# Patient Record
Sex: FEMALE | Race: WHITE
[De-identification: names, ages, dates, MRNs, and addresses within clinical notes are randomized per-mention and may not be internally consistent; named-entity substitution may affect disease eponyms.]

---

## 2017-07-07 ENCOUNTER — HOSPITAL ENCOUNTER (EMERGENCY)
Dept: HOSPITAL 75 - ER | Age: 27
Discharge: HOME | End: 2017-07-07
Payer: MEDICARE

## 2017-07-07 VITALS — WEIGHT: 125.44 LBS | BODY MASS INDEX: 22.23 KG/M2 | HEIGHT: 63 IN

## 2017-07-07 VITALS — SYSTOLIC BLOOD PRESSURE: 118 MMHG | DIASTOLIC BLOOD PRESSURE: 68 MMHG
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
## 2017-07-07 DIAGNOSIS — F32.9: ICD-10-CM

## 2017-07-07 DIAGNOSIS — Z87.39: ICD-10-CM

## 2017-07-07 DIAGNOSIS — F41.9: ICD-10-CM

## 2017-07-07 DIAGNOSIS — S40.012A: Primary | ICD-10-CM

## 2017-07-07 PROCEDURE — 73000 X-RAY EXAM OF COLLAR BONE: CPT

## 2017-07-07 PROCEDURE — 73030 X-RAY EXAM OF SHOULDER: CPT

## 2017-07-07 PROCEDURE — 99282 EMERGENCY DEPT VISIT SF MDM: CPT

## 2017-07-07 NOTE — DIAGNOSTIC IMAGING REPORT
INDICATION: Left shoulder pain after a fall.



3 views of the left shoulder show no fracture, dislocation or

other acute abnormalities.



IMPRESSION: Negative left shoulder.



Dictated by: 



  Dictated on workstation # [id number]

## 2017-07-07 NOTE — DIAGNOSTIC IMAGING REPORT
INDICATION: Left shoulder injury



2 views of the left clavicle show no fracture or dislocation.



IMPRESSION: Negative left clavicle.



Dictated by: 



  Dictated on workstation # [id number]

## 2017-07-07 NOTE — ED UPPER EXTREMITY
General


Chief Complaint:  Upper Extremity


Stated Complaint:  RT SHOULDER INJ FALL FROM PORCH


Source:  patient





History of Present Illness


Time seen by provider:  13:40


Initial Comments


The patient is a 27-year-old white female who reports that just prior to 

admission she fell off her porch and landed heavily on her left shoulder.  When 

Questioned as to why this happened she reported that she had been lecturing her 

children on what might happen if they leaned over too far and fell.  She 

apparently demonstrated in a graphic sort of way.  She complains of pain at the 

left clavicle and labrum areas.  There is no visible defect.


Onset:  just prior to arrival


Pain/Injury Location:  left shoulder


Method of Injury:  fell





Allergies and Home Medications


Allergies


Coded Allergies:  


     tramadol (Unverified  Allergy, Mild, RASH, 12/21/09)





Home Medications


Hydrocodone/Acetaminophen 1 Each Tablet, 1-2 EACH PO Q4H, #35


   Prescribed by: ANJEL ANTUNEZ on 9/9/16 1002


Omeprazole 20 Mg Tablet.dr, 20 MG PO DAILY, (Reported)


Ondansetron 4 Mg Tab.rapdis, 4 MG PO Q4H PRN for NAUSEA, (Reported)


Ranitidine HCl 150 Mg Tablet, 150 MG PO DAILY, (Reported)


[bcp]  , 1 TAB PO DAILY, (Reported)





Constitutional:  see HPI


EENTM:  no symptoms reported


Respiratory:  no symptoms reported


Cardiovascular:  no symptoms reported


Gastrointestinal:  no symptoms reported


Musculoskeletal:  other (left shoulder pain)





Past Medical-Social-Family Hx


Patient Social History


Type Used:  Cigarettes


Recent Foreign Travel:  No


Contact w/Someone Who Travel:  No





Immunizations Up To Date


Tetanus Booster (TDap):  Less than 5yrs


PED Vaccines UTD:  Yes





Seasonal Allergies


Seasonal Allergies:  No





Surgeries


HX Surgeries:  No





Respiratory


Hx Respiratory Disorders:  No





Cardiovascular


Hx Cardiac Disorders:  No





Neurological


Hx Neurological Disorders:  Yes





Reproductive System


Hx Reproductive Disorders:  No


Sexually Transmitted Disease:  No


HIV/AIDS:  No


Female Reproductive Disorders:  Denies





Genitourinary


Hx Genitourinary Disorders:  No





Gastrointestinal


Hx Gastrointestinal Disorders:  Yes


Gastrointestinal Disorders:  Gall Bladder Disease





Musculoskeletal


Hx Musculoskeletal Disorders:  No


Musculoskeletal Disorders:  Fractures





Endocrine


Hx Endocrine Disorders:  No





HEENT


HX ENT Disorders:  No


Loss of Vision:  Denies


Hearing Impairment:  Denies





Cancer


Hx Cancer:  No





Psychosocial


Hx Psychiatric Problems:  Yes


Behavioral Health Disorders:  Anxiety, Depression





Integumentary


HX Skin/Integumentary Disorder:  No





Blood Transfusions


Hx Blood Disorders:  No


Adverse Reaction to a Blood Tr:  No





Family Medical History


Significant Family History:  No Pertinent Family Hx


Family Medial History:  


Asthma


  21 FATHER, Onset:Childhood


Thyroid disease


  21 MOTHER, Onset:30's - 40





Physical Exam


Vital Signs





Vital Sign - Last 12Hours








 7/7/17





 13:30


 


Temp 97.3


 


Pulse 85


 


Resp 16


 


B/P (MAP) 118/68


 


Pulse Ox 97


 


O2 Delivery Room Air





Capillary Refill :


General Appearance:  moderate distress


HEENT:  normal ENT inspection


Neck:  full range of motion


Cardiovascular:  normal peripheral pulses, regular rate, rhythm, no edema, no 

gallop, no JVD, no murmur


Respiratory:  chest non-tender, lungs clear, normal breath sounds, no 

respiratory distress, no accessory muscle use


Comments


Tender to palpation along the left clavicle without palpable deformity.  

Palpable pain at the area of the labrum without step-off





Progress/Results/Core Measures


Results/Orders


My Orders





Orders - ANAHY ZAIDI MD


Shoulder, Left, 3 Views (7/7/17 13:46)


Clavicle, Left (7/7/17 13:46)





Vital Signs/I&O





Vital Sign - Last 12Hours








 7/7/17





 13:30


 


Temp 97.3


 


Pulse 85


 


Resp 16


 


B/P (MAP) 118/68


 


Pulse Ox 97


 


O2 Delivery Room Air











Departure


Communication


Progress Notes


X-rays show no bony abnormalities of the shoulder or clavicle.





Impression


Impression:  


 Primary Impression:  


 left shoulder contusion


Disposition:  01 HOME, SELF-CARE


Condition:  Stable/Unchanged





Departure-Patient Inst.


Decision time for Depature:  14:28


Referrals:  


TERE FLEMING MD (PCP/Family)


Primary Care Physician


Patient Instructions:  How to Use a Shoulder Sling





Add. Discharge Instructions:  


All discharge instructions reviewed with patient and/or family. Voiced 

understanding.


The patient is informed that there are no bony abnormalities.  The possibility 

of rotator cuff injury remains.





Use shoulder sling as demonstrated.


Use ice pack for 30 minutes at least 3 times before bedtime.


Ibuprofen 600 mg 3 times daily


If you are unable to recognize any improvement by one week's time, see your 

provider for possible referral to orthopedics.











ANAHY ZAIDI MD Jul 7, 2017 13:54

## 2017-07-11 NOTE — XMS REPORT
[facility]

 Created on: 10/25/2016



Lorena Ty

External Reference #: [id number]

: 1990

Sex: Female



Demographics







 Address  [address] [place]  [address]

 

 Home Phone  [phone]

 

 Preferred Language  Unknown

 

 Marital Status  Unknown

 

 [employer] Affiliation  Unknown

 

 Race  White

 

 Ethnic Group  Not  or 





Author







 BRAIN Best

 

 Organization  [employer]

 

 Address  Unknown

 

 Phone  Unavailable







Care Team Providers







 Care Team Member Name  Role  Phone

 

 BRAIN PRATT  CP  Unavailable



                                                                



Allergies

          No Known Allergies                                                   
                                     



Problems

          





 Problem Type  Condition  Code  Onset Dates  Condition Status

 

 Problem  Generalized anxiety disorder  F41.1     Active

 

 Problem  ADHD, predominantly inattentive type  F90.0     Active

 

 Problem  Depressive disorder, not elsewhere classified  F32.9     Active

 

 Assessment  ADHD, predominantly inattentive type  F90.0     Active

 

 Assessment  Depressive disorder, not elsewhere classified  F32.9     Active

 

 Assessment  Generalized anxiety disorder  F41.1     Active



                                                                               
                                                           



Medications

          No Known Medications                                                 
                                       



Procedures

          





 Procedure  Coding System  Code  Date

 

 Psychotherapy, patient &/family, 45 minutes, established patient  CPT-4  27397
  Oct 21, 2016



                                                                              



Results

          No Known Results                                                     
               



Summary Purpose

          [employer] Submission

## 2018-01-10 ENCOUNTER — HOSPITAL ENCOUNTER (OUTPATIENT)
Dept: HOSPITAL 75 - RAD | Age: 28
End: 2018-01-10
Attending: OBSTETRICS & GYNECOLOGY
Payer: MEDICARE

## 2018-01-10 DIAGNOSIS — E04.9: Primary | ICD-10-CM

## 2018-01-10 PROCEDURE — 76536 US EXAM OF HEAD AND NECK: CPT

## 2018-01-10 NOTE — DIAGNOSTIC IMAGING REPORT
PROCEDURE: US Thyroid.



TECHNIQUE: Multiple real-time grayscale images were obtained of

the thyroid in various projections.



INDICATION: Goiter.



FINDINGS: There are no prior studies available for comparison.



The thyroid gland is prominent with the right lobe measuring 5.3

x 1.9 x 1.9 cm and the left lobe estimated to be 4.8 x 1.7 x 1.7

cm (normal gland size 4-5 x 2 x 2 cm or less). There is no

discrete solid or cystic mass within either lobe, but the thyroid

gland itself has somewhat of a coarse echotexture.



IMPRESSION:

1. The thyroid gland is at the upper limits of normal in size,

but there is no discrete solid or cystic mass identified.

2. If further imaging evaluation of the thyroid gland is desired,

then a nuclear medicine thyroid scan with uptake will be

recommended.



Dictated by: 



  Dictated on workstation # [id number]

## 2018-06-17 ENCOUNTER — HOSPITAL ENCOUNTER (INPATIENT)
Dept: HOSPITAL 75 - LDRP | Age: 28
LOS: 2 days | Discharge: HOME | End: 2018-06-19
Attending: OBSTETRICS & GYNECOLOGY | Admitting: OBSTETRICS & GYNECOLOGY
Payer: MEDICARE

## 2018-06-17 VITALS — DIASTOLIC BLOOD PRESSURE: 75 MMHG | SYSTOLIC BLOOD PRESSURE: 106 MMHG

## 2018-06-17 VITALS — SYSTOLIC BLOOD PRESSURE: 107 MMHG | DIASTOLIC BLOOD PRESSURE: 67 MMHG

## 2018-06-17 VITALS — DIASTOLIC BLOOD PRESSURE: 56 MMHG | SYSTOLIC BLOOD PRESSURE: 99 MMHG

## 2018-06-17 VITALS — DIASTOLIC BLOOD PRESSURE: 67 MMHG | SYSTOLIC BLOOD PRESSURE: 105 MMHG

## 2018-06-17 VITALS — SYSTOLIC BLOOD PRESSURE: 112 MMHG | DIASTOLIC BLOOD PRESSURE: 72 MMHG

## 2018-06-17 VITALS — DIASTOLIC BLOOD PRESSURE: 57 MMHG | SYSTOLIC BLOOD PRESSURE: 113 MMHG

## 2018-06-17 VITALS — DIASTOLIC BLOOD PRESSURE: 65 MMHG | SYSTOLIC BLOOD PRESSURE: 104 MMHG

## 2018-06-17 VITALS — DIASTOLIC BLOOD PRESSURE: 57 MMHG | SYSTOLIC BLOOD PRESSURE: 117 MMHG

## 2018-06-17 VITALS — SYSTOLIC BLOOD PRESSURE: 123 MMHG | DIASTOLIC BLOOD PRESSURE: 67 MMHG

## 2018-06-17 VITALS — SYSTOLIC BLOOD PRESSURE: 106 MMHG | DIASTOLIC BLOOD PRESSURE: 55 MMHG

## 2018-06-17 VITALS — DIASTOLIC BLOOD PRESSURE: 80 MMHG | SYSTOLIC BLOOD PRESSURE: 120 MMHG

## 2018-06-17 VITALS — SYSTOLIC BLOOD PRESSURE: 112 MMHG | DIASTOLIC BLOOD PRESSURE: 57 MMHG

## 2018-06-17 VITALS — DIASTOLIC BLOOD PRESSURE: 69 MMHG | SYSTOLIC BLOOD PRESSURE: 111 MMHG

## 2018-06-17 VITALS — SYSTOLIC BLOOD PRESSURE: 122 MMHG | DIASTOLIC BLOOD PRESSURE: 75 MMHG

## 2018-06-17 VITALS — SYSTOLIC BLOOD PRESSURE: 118 MMHG | DIASTOLIC BLOOD PRESSURE: 58 MMHG

## 2018-06-17 VITALS — SYSTOLIC BLOOD PRESSURE: 116 MMHG | DIASTOLIC BLOOD PRESSURE: 59 MMHG

## 2018-06-17 VITALS — SYSTOLIC BLOOD PRESSURE: 116 MMHG | DIASTOLIC BLOOD PRESSURE: 65 MMHG

## 2018-06-17 VITALS — SYSTOLIC BLOOD PRESSURE: 106 MMHG | DIASTOLIC BLOOD PRESSURE: 65 MMHG

## 2018-06-17 VITALS — SYSTOLIC BLOOD PRESSURE: 109 MMHG | DIASTOLIC BLOOD PRESSURE: 63 MMHG

## 2018-06-17 VITALS — SYSTOLIC BLOOD PRESSURE: 135 MMHG | DIASTOLIC BLOOD PRESSURE: 93 MMHG

## 2018-06-17 VITALS — DIASTOLIC BLOOD PRESSURE: 79 MMHG | SYSTOLIC BLOOD PRESSURE: 97 MMHG

## 2018-06-17 VITALS — BODY MASS INDEX: 25.16 KG/M2 | WEIGHT: 142 LBS | HEIGHT: 63 IN

## 2018-06-17 VITALS — SYSTOLIC BLOOD PRESSURE: 103 MMHG | DIASTOLIC BLOOD PRESSURE: 65 MMHG

## 2018-06-17 VITALS — SYSTOLIC BLOOD PRESSURE: 137 MMHG | DIASTOLIC BLOOD PRESSURE: 84 MMHG

## 2018-06-17 VITALS — DIASTOLIC BLOOD PRESSURE: 55 MMHG | SYSTOLIC BLOOD PRESSURE: 108 MMHG

## 2018-06-17 VITALS — SYSTOLIC BLOOD PRESSURE: 116 MMHG | DIASTOLIC BLOOD PRESSURE: 69 MMHG

## 2018-06-17 VITALS — SYSTOLIC BLOOD PRESSURE: 110 MMHG | DIASTOLIC BLOOD PRESSURE: 64 MMHG

## 2018-06-17 VITALS — DIASTOLIC BLOOD PRESSURE: 66 MMHG | SYSTOLIC BLOOD PRESSURE: 112 MMHG

## 2018-06-17 VITALS — DIASTOLIC BLOOD PRESSURE: 55 MMHG | SYSTOLIC BLOOD PRESSURE: 110 MMHG

## 2018-06-17 VITALS — SYSTOLIC BLOOD PRESSURE: 114 MMHG | DIASTOLIC BLOOD PRESSURE: 67 MMHG

## 2018-06-17 VITALS — DIASTOLIC BLOOD PRESSURE: 60 MMHG | SYSTOLIC BLOOD PRESSURE: 108 MMHG

## 2018-06-17 VITALS — DIASTOLIC BLOOD PRESSURE: 66 MMHG | SYSTOLIC BLOOD PRESSURE: 109 MMHG

## 2018-06-17 DIAGNOSIS — F17.210: ICD-10-CM

## 2018-06-17 DIAGNOSIS — Z3A.37: ICD-10-CM

## 2018-06-17 LAB
APTT PPP: YELLOW S
BACTERIA #/AREA URNS HPF: NEGATIVE /HPF
BARBITURATES UR QL: NEGATIVE
BASOPHILS # BLD AUTO: 0 10^3/UL (ref 0–0.1)
BASOPHILS NFR BLD AUTO: 0 % (ref 0–10)
BENZODIAZ UR QL SCN: NEGATIVE
BILIRUB UR QL STRIP: NEGATIVE
COCAINE UR QL: NEGATIVE
EOSINOPHIL # BLD AUTO: 0.1 10^3/UL (ref 0–0.3)
EOSINOPHIL NFR BLD AUTO: 1 % (ref 0–10)
ERYTHROCYTE [DISTWIDTH] IN BLOOD BY AUTOMATED COUNT: 13.8 % (ref 10–14.5)
FIBRINOGEN PPP-MCNC: CLEAR MG/DL
GLUCOSE UR STRIP-MCNC: NEGATIVE MG/DL
HCT VFR BLD CALC: 38 % (ref 35–52)
HGB BLD-MCNC: 12.7 G/DL (ref 11.5–16)
KETONES UR QL STRIP: NEGATIVE
LEUKOCYTE ESTERASE UR QL STRIP: (no result)
LYMPHOCYTES # BLD AUTO: 3 X 10^3 (ref 1–4)
LYMPHOCYTES NFR BLD AUTO: 27 % (ref 12–44)
MANUAL DIFFERENTIAL PERFORMED BLD QL: NO
MCH RBC QN AUTO: 29 PG (ref 25–34)
MCHC RBC AUTO-ENTMCNC: 34 G/DL (ref 32–36)
MCV RBC AUTO: 86 FL (ref 80–99)
METHADONE UR QL SCN: NEGATIVE
METHAMPHETAMINE SCREEN URINE S: NEGATIVE
MONOCYTES # BLD AUTO: 1 X 10^3 (ref 0–1)
MONOCYTES NFR BLD AUTO: 9 % (ref 0–12)
NEUTROPHILS # BLD AUTO: 6.8 X 10^3 (ref 1.8–7.8)
NEUTROPHILS NFR BLD AUTO: 62 % (ref 42–75)
NITRITE UR QL STRIP: NEGATIVE
OPIATES UR QL SCN: NEGATIVE
OXYCODONE UR QL: NEGATIVE
PH UR STRIP: 7 [PH] (ref 5–9)
PLATELET # BLD: 224 10^3/UL (ref 130–400)
PMV BLD AUTO: 10.6 FL (ref 7.4–10.4)
PROPOXYPH UR QL: NEGATIVE
PROT UR QL STRIP: NEGATIVE
RBC # BLD AUTO: 4.41 10^6/UL (ref 4.35–5.85)
RBC #/AREA URNS HPF: (no result) /HPF
SP GR UR STRIP: 1 (ref 1.02–1.02)
SQUAMOUS #/AREA URNS HPF: (no result) /HPF
TRICYCLICS UR QL SCN: NEGATIVE
UROBILINOGEN UR-MCNC: NORMAL MG/DL
WBC # BLD AUTO: 10.9 10^3/UL (ref 4.3–11)
WBC #/AREA URNS HPF: (no result) /HPF

## 2018-06-17 PROCEDURE — 85025 COMPLETE CBC W/AUTO DIFF WBC: CPT

## 2018-06-17 PROCEDURE — 80306 DRUG TEST PRSMV INSTRMNT: CPT

## 2018-06-17 PROCEDURE — 86900 BLOOD TYPING SEROLOGIC ABO: CPT

## 2018-06-17 PROCEDURE — 99212 OFFICE O/P EST SF 10 MIN: CPT

## 2018-06-17 PROCEDURE — 86901 BLOOD TYPING SEROLOGIC RH(D): CPT

## 2018-06-17 PROCEDURE — 36415 COLL VENOUS BLD VENIPUNCTURE: CPT

## 2018-06-17 PROCEDURE — 86850 RBC ANTIBODY SCREEN: CPT

## 2018-06-17 PROCEDURE — 81000 URINALYSIS NONAUTO W/SCOPE: CPT

## 2018-06-17 PROCEDURE — 0UQMXZZ REPAIR VULVA, EXTERNAL APPROACH: ICD-10-PCS | Performed by: OBSTETRICS & GYNECOLOGY

## 2018-06-17 RX ADMIN — Medication SCH ML: at 22:38

## 2018-06-17 RX ADMIN — BENZOCAINE AND LEVOMENTHOL PRN ML: 200; 5 SPRAY TOPICAL at 20:55

## 2018-06-17 RX ADMIN — KETOROLAC TROMETHAMINE SCH MG: 30 INJECTION, SOLUTION INTRAMUSCULAR; INTRAVENOUS at 20:45

## 2018-06-17 RX ADMIN — KETOROLAC TROMETHAMINE SCH MG: 30 INJECTION, SOLUTION INTRAMUSCULAR; INTRAVENOUS at 19:53

## 2018-06-17 RX ADMIN — Medication SCH ML: at 17:20

## 2018-06-17 RX ADMIN — OXYCODONE HYDROCHLORIDE AND ACETAMINOPHEN PRN TAB: 5; 325 TABLET ORAL at 21:08

## 2018-06-17 RX ADMIN — DOCUSATE SODIUM SCH MG: 100 CAPSULE ORAL at 20:55

## 2018-06-17 NOTE — OPERATIVE REPORT
DATE OF SERVICE:  06/17/2018



DELIVERY NOTE



The patient delivered by term spontaneous vaginal delivery a viable female

infant with Apgars of 8 and 9 at 1 and 5 minutes respectively, weight that is

pending at this time.  Birth time of 18:27.  Cord blood gas is pending.  The

patient delivered under epidural analgesia over an intact perineum.  The infant

was bulb suctioned on delivery of the head and nuchal cord was easily released

and then the delivery completed.  Infant was bulb suctioned again.  The

umbilical cord was doubly clamped, father cut the cord.  Baby was passed to

mother's abdomen.  The placenta delivered spontaneously Purvis.  It had a

fairly large accessory lobe, but was otherwise normal with a 3-vessel cord.  It

was sent to pathology for permanent section.  The cervix, vagina, rectum and

perineum were examined and found intact, except for a left periurethral

laceration full thickness through the labia minora inner fold.  This was

repaired with interrupted sutures of 3-0 Vicryl Rapide.  There was a superficial

abrasion on the right in the same area that was not bleeding and was not

repaired.



The patient tolerated the delivery and the repair well and remained in the LDR

for recovery.  The baby remained with the mother.





Job ID: [id number]

DocumentID: [id number]

Dictated Date:  06/17/2018 18:42:36

Transcription Date: 06/17/2018 18:58:13

Dictated By: JULIA MANRIQUE MD

## 2018-06-17 NOTE — HISTORY & PHYSICAL
History and Physical


Date Seen by Provider:  2018


Time Seen by Provider:  14:36


Patient is a P4 white female with a due date of 2018 putting her at 37 

Weeks gestation.  She presented with complaint of contractions.  She was 

dilated 4-5 cm.  She denies ruptured membranes or bleeding.  She's had a 

problem with pregnancy.  She says uncomplicated vaginal deliveries.  Her GBS 

culture was negative on 2018





Allergies tramadol which causes hives





Medications are prenatal vitamins





Medical social and surgical histories are per the antepartum record HEENT exam 

is normal except for her exceedingly poor dentition


Neck is supple no lymphadenopathy no thyromegaly is gravid soft nontender 

nondistended


Extremities shows no clubbing cyanosis.  There is no Homans sign.


Pelvic exam is pending per nursing and patient is 5+ and dilated with a bulging 

bag and a vertex presentation





Laboratory Tests


18 12:20

















Assessment and plan   term pregnancy in spontaneous labor.  Patient has a 

history of rapid progress and short labors.  Anticipation is for vaginal 

delivery with likely shortly


Term pregnancy in labor





Allergies and Home Medications


Allergies


Coded Allergies:  


     tramadol (Unverified  Allergy, Mild, RASH, 09)





Home Medications


Multivitamin 1 Each Tab.chew, 3 EACH PO DAILY, (Reported)





Patient Home Medication List


Home Medication List Reviewed:  Yes





Clinical Quality Measures


DVT/VTE Risk/Contraindication:


Risk Factor Score Per Nursin


RFS Level Per Nursing on Admit:  2=Moderate











JULIA MANRIQUE MD 2018 2:39 pm

## 2018-06-18 VITALS — DIASTOLIC BLOOD PRESSURE: 58 MMHG | SYSTOLIC BLOOD PRESSURE: 97 MMHG

## 2018-06-18 VITALS — SYSTOLIC BLOOD PRESSURE: 102 MMHG | DIASTOLIC BLOOD PRESSURE: 63 MMHG

## 2018-06-18 VITALS — SYSTOLIC BLOOD PRESSURE: 104 MMHG | DIASTOLIC BLOOD PRESSURE: 64 MMHG

## 2018-06-18 VITALS — DIASTOLIC BLOOD PRESSURE: 62 MMHG | SYSTOLIC BLOOD PRESSURE: 95 MMHG

## 2018-06-18 VITALS — DIASTOLIC BLOOD PRESSURE: 53 MMHG | SYSTOLIC BLOOD PRESSURE: 106 MMHG

## 2018-06-18 VITALS — DIASTOLIC BLOOD PRESSURE: 58 MMHG | SYSTOLIC BLOOD PRESSURE: 103 MMHG

## 2018-06-18 RX ADMIN — Medication SCH ML: at 08:22

## 2018-06-18 RX ADMIN — Medication SCH ML: at 22:00

## 2018-06-18 RX ADMIN — IBUPROFEN SCH MG: 800 TABLET, FILM COATED ORAL at 08:26

## 2018-06-18 RX ADMIN — KETOROLAC TROMETHAMINE SCH MG: 30 INJECTION, SOLUTION INTRAMUSCULAR; INTRAVENOUS at 01:55

## 2018-06-18 RX ADMIN — DOCUSATE SODIUM SCH MG: 100 CAPSULE ORAL at 20:07

## 2018-06-18 RX ADMIN — Medication SCH ML: at 18:42

## 2018-06-18 RX ADMIN — IBUPROFEN SCH MG: 800 TABLET, FILM COATED ORAL at 14:01

## 2018-06-18 RX ADMIN — DOCUSATE SODIUM SCH MG: 100 CAPSULE ORAL at 08:25

## 2018-06-18 RX ADMIN — IBUPROFEN SCH MG: 800 TABLET, FILM COATED ORAL at 20:06

## 2018-06-18 RX ADMIN — OXYCODONE HYDROCHLORIDE AND ACETAMINOPHEN PRN TAB: 5; 325 TABLET ORAL at 20:59

## 2018-06-18 RX ADMIN — OXYCODONE HYDROCHLORIDE AND ACETAMINOPHEN PRN TAB: 5; 325 TABLET ORAL at 01:54

## 2018-06-18 RX ADMIN — OXYCODONE HYDROCHLORIDE AND ACETAMINOPHEN PRN TAB: 5; 325 TABLET ORAL at 15:42

## 2018-06-18 NOTE — PROGRESS NOTE-STANDARD
Standard Progress Note


Progress Notes/Assess & Plan


Date Seen by Provider:  Jun 18, 2018


Time Seen by Provider:  07:37


Progress/Assessment & Plan


Patient is without complaint.  She is ambulating, voiding, tolerating by mouth 

well, has good pain control.








Vital Signs








 6/18/18





 03:55


 


Temp 97.6


 


Pulse 69


 


Resp 18


 


B/P (MAP) 106/53 (70)


 


Pulse Ox 99


 


O2 Delivery Room Air





Vital signs are stable.  Patient is afebrile.





Fundus is firm below the umbilicus and nontender.


Extremities show no clubbing cyanosis.  There is no Homans sign.





Assessment and plan   postpartum day number 1 status post term spontaneous 

vaginal delivery doing well.  Plan is for routine convalescence.  Day and 

likely discharge home tomorrow











JULIA MANRIQUE MD Jun 18, 2018 7:37 am

## 2018-06-18 NOTE — ANESTHESIA-REGIONAL POST-OP
Regional


Patient Condition


Mental Status:  Alert, Oriented x3


Circulation:  Same as Pre-Op


Headache:  Absent


Sensation:  Full Recovery


Motor Block:  Absent





Post Op Complications


Complications


None





Follow Up Care/Instructions


Patient Instructions


None needed.





Anesthesia/Patient Condition


Patient is doing well, no complaints, stable vital signs, no apparent adverse 

anesthesia problems.   


No complications reported per nursing.











MANUEL RODRIGUES CRNA Jun 18, 2018 10:22

## 2018-06-18 NOTE — DISCHARGE INSTRUCTIONS
Discharge Instructions


Discharge Medications


New, Converted or Re-Newed RX:  RX on Chart





Patient Instructions


Patient Instructions:  


As directed


Return to The Hospital For:  


As directed





Activity & Diet


Discharge Diet:  No Restrictions


Activity as Tolerated:  No





Orders-Post D/C & Referrals





Follow Up Appt:


Call to make follow up appt. for patient in 4 weeks.





Activity 


Per routine post vaginal delivery instructions.





Please call in RX to patient pharmacy.





Diet as tolerated





Patient may shower or tub bathe as desired.











JULIA MANRIQUE MD Jun 18, 2018 7:54 am

## 2018-06-19 VITALS — SYSTOLIC BLOOD PRESSURE: 106 MMHG | DIASTOLIC BLOOD PRESSURE: 58 MMHG

## 2018-06-19 VITALS — SYSTOLIC BLOOD PRESSURE: 105 MMHG | DIASTOLIC BLOOD PRESSURE: 75 MMHG

## 2018-06-19 VITALS — DIASTOLIC BLOOD PRESSURE: 75 MMHG | SYSTOLIC BLOOD PRESSURE: 105 MMHG

## 2018-06-19 RX ADMIN — BENZOCAINE AND LEVOMENTHOL PRN ML: 200; 5 SPRAY TOPICAL at 10:50

## 2018-06-19 RX ADMIN — IBUPROFEN SCH MG: 800 TABLET, FILM COATED ORAL at 08:31

## 2018-06-19 RX ADMIN — OXYCODONE HYDROCHLORIDE AND ACETAMINOPHEN PRN TAB: 5; 325 TABLET ORAL at 01:29

## 2018-06-19 RX ADMIN — Medication SCH ML: at 06:09

## 2018-06-19 RX ADMIN — IBUPROFEN SCH MG: 800 TABLET, FILM COATED ORAL at 01:28

## 2018-06-19 RX ADMIN — OXYCODONE HYDROCHLORIDE AND ACETAMINOPHEN PRN TAB: 5; 325 TABLET ORAL at 06:10

## 2018-06-19 RX ADMIN — DOCUSATE SODIUM SCH MG: 100 CAPSULE ORAL at 08:31

## 2018-06-19 NOTE — PROGRESS NOTE-STANDARD
Standard Progress Note


Progress Notes/Assess & Plan


Date Seen by Provider:  Jun 19, 2018


Time Seen by Provider:  07:54


Progress/Assessment & Plan


Patient is without complaint.  She is ambulating, voiding, tolerating by mouth 

well, has good pain control.








Vital Signs








 6/18/18





 03:55


 


Temp 97.6


 


Pulse 69


 


Resp 18


 


B/P (MAP) 106/53 (70)


 


Pulse Ox 99


 


O2 Delivery Room Air





Vital signs are stable.  Patient is afebrile.





Fundus is firm below the umbilicus and nontender.


Extremities show no clubbing cyanosis.  There is no Homans sign.





Assessment and plan   postpartum day number 1 status post term spontaneous 

vaginal delivery doing well.  Plan is for routine convalescence.  Day and 

likely discharge home tomorrow





6-19-18





No c/o. Ready for d/c





[employer]   


Vital Signs








 6/18/18 6/19/18





 20:00 01:30


 


Temp  98.7


 


Pulse  73


 


Resp  18


 


B/P (MAP)  106/58 (74)


 


Pulse Ox 100 


 


O2 Delivery  Room Air





FF below UMB


No homans





a/p   PPD 2 Doing well. Home


Final Diagnosis


JULIA PEREZ MD Jun 19, 2018 7:55 am

## 2019-12-31 ENCOUNTER — HOSPITAL ENCOUNTER (EMERGENCY)
Dept: HOSPITAL 75 - ER | Age: 29
Discharge: HOME | End: 2019-12-31
Payer: MEDICARE

## 2019-12-31 VITALS — HEIGHT: 62.99 IN | BODY MASS INDEX: 23.05 KG/M2 | WEIGHT: 130.07 LBS

## 2019-12-31 VITALS — SYSTOLIC BLOOD PRESSURE: 114 MMHG | DIASTOLIC BLOOD PRESSURE: 72 MMHG

## 2019-12-31 DIAGNOSIS — M25.512: Primary | ICD-10-CM

## 2019-12-31 DIAGNOSIS — Z88.5: ICD-10-CM

## 2019-12-31 DIAGNOSIS — Z82.49: ICD-10-CM

## 2019-12-31 DIAGNOSIS — F41.9: ICD-10-CM

## 2019-12-31 DIAGNOSIS — F17.210: ICD-10-CM

## 2020-01-03 ENCOUNTER — HOSPITAL ENCOUNTER (OUTPATIENT)
Dept: HOSPITAL 75 - RAD | Age: 30
End: 2020-01-03
Attending: PEDIATRICS
Payer: MEDICARE

## 2020-01-03 DIAGNOSIS — G89.29: Primary | ICD-10-CM

## 2020-01-03 DIAGNOSIS — M54.2: ICD-10-CM

## 2020-01-03 DIAGNOSIS — M25.512: ICD-10-CM

## 2020-01-03 DIAGNOSIS — M54.6: ICD-10-CM

## 2020-01-03 PROCEDURE — 72070 X-RAY EXAM THORAC SPINE 2VWS: CPT

## 2020-01-03 PROCEDURE — 73010 X-RAY EXAM OF SHOULDER BLADE: CPT

## 2020-01-03 PROCEDURE — 72040 X-RAY EXAM NECK SPINE 2-3 VW: CPT

## 2020-01-03 NOTE — DIAGNOSTIC IMAGING REPORT
INDICATION: Left-sided pain extending into the left leg.



TIME OF EXAM: 12:12 p.m.



FINDINGS:  Frontal and lateral views of the thoracic spine were

obtained. Cervicothoracic junction is better visualized on this

study. Alignment is normal. Vertebral body heights are

maintained. There is normal thoracic kyphotic curvature. No

fracture or subluxation is seen. Pedicles and paraspinous line

are intact.



IMPRESSION: No acute abnormality is identified.



Dictated by: 



  Dictated on workstation # [id number]

## 2020-01-03 NOTE — DIAGNOSTIC IMAGING REPORT
INDICATION: Left-sided neck pain and left shoulder pain.



TIME OF EXAM: 12:08 p.m.



FINDINGS: Three views of the cervical spine were obtained. There

is straightening of the normal cervical lordotic curvature. C1

through C6 is seen with clarity. C7 and T1 are not well

visualized on the lateral view. Prevertebral tissues are within

normal limits. Odontoid is intact. No fractures are seen.



IMPRESSION: No acute feature identified at C1-C6. Cervicothoracic

junction is not well visualized on the lateral view.



Dictated by: 



  Dictated on workstation # [id number]

## 2021-07-18 ENCOUNTER — HOSPITAL ENCOUNTER (OUTPATIENT)
Dept: HOSPITAL 75 - WSO | Age: 31
End: 2021-07-18
Attending: OBSTETRICS & GYNECOLOGY
Payer: MEDICARE

## 2021-07-18 VITALS — WEIGHT: 163.58 LBS | HEIGHT: 62.99 IN | BODY MASS INDEX: 28.98 KG/M2

## 2021-07-18 VITALS — SYSTOLIC BLOOD PRESSURE: 127 MMHG | DIASTOLIC BLOOD PRESSURE: 71 MMHG

## 2021-07-18 VITALS — SYSTOLIC BLOOD PRESSURE: 109 MMHG | DIASTOLIC BLOOD PRESSURE: 58 MMHG

## 2021-07-18 DIAGNOSIS — Z3A.00: ICD-10-CM

## 2021-07-19 NOTE — PHYSICIAN QUERY-FINAL DX
SARAH ALMANZA 7/19/21 0807:


Clinic Account Progress/Dx


Physician Query:


Please give diagnosis





Please include # weeks gestation


Date of Service





Jul 18, 2021 at 02:17





JULIA MANRIQUE MD 7/20/21 0824:


Clinic Account Progress/Dx


DIAGNOSIS:


Diagnosis


False labor at 35 weeks gestation











SARAH ALMANZA                    Jul 19, 2021 08:07


JULIA MANRIQUE MD       Jul 20, 2021 08:24

## 2021-08-07 ENCOUNTER — HOSPITAL ENCOUNTER (OUTPATIENT)
Dept: HOSPITAL 75 - WSO | Age: 31
LOS: 1 days | Discharge: HOME | End: 2021-08-08
Attending: OBSTETRICS & GYNECOLOGY
Payer: MEDICARE

## 2021-08-07 VITALS — DIASTOLIC BLOOD PRESSURE: 77 MMHG | SYSTOLIC BLOOD PRESSURE: 133 MMHG

## 2021-08-07 VITALS — WEIGHT: 159.84 LBS | HEIGHT: 63.98 IN | BODY MASS INDEX: 27.29 KG/M2

## 2021-08-07 DIAGNOSIS — Z3A.38: ICD-10-CM

## 2021-08-07 DIAGNOSIS — O47.1: Primary | ICD-10-CM

## 2021-08-07 PROCEDURE — 99214 OFFICE O/P EST MOD 30 MIN: CPT

## 2021-08-08 VITALS — DIASTOLIC BLOOD PRESSURE: 79 MMHG | SYSTOLIC BLOOD PRESSURE: 113 MMHG

## 2021-08-08 VITALS — DIASTOLIC BLOOD PRESSURE: 72 MMHG | SYSTOLIC BLOOD PRESSURE: 112 MMHG

## 2021-08-08 VITALS — SYSTOLIC BLOOD PRESSURE: 110 MMHG | DIASTOLIC BLOOD PRESSURE: 66 MMHG

## 2021-08-08 VITALS — SYSTOLIC BLOOD PRESSURE: 133 MMHG | DIASTOLIC BLOOD PRESSURE: 77 MMHG

## 2021-08-09 NOTE — PHYSICIAN QUERY-FINAL DX
SARAH ALMANZA 8/9/21 0807:


Clinic Account Progress/Dx


Physician Query:


Please give diagnosis





Please include # weeks gestation


Date of Service





Aug 7, 2021 at 23:39





JULIA MANRIQUE MD 8/10/21 0542:


Clinic Account Progress/Dx


DIAGNOSIS:


Diagnosis


False labor at 38 weeks gestation











SARAH ALMANZA                     Aug 9, 2021 08:07


JULIA MANRIQUE MD       Aug 10, 2021 05:42

## 2022-05-11 ENCOUNTER — HOSPITAL ENCOUNTER (OUTPATIENT)
Dept: HOSPITAL 75 - RAD | Age: 32
End: 2022-05-11
Attending: NURSE PRACTITIONER
Payer: MEDICARE

## 2022-05-11 DIAGNOSIS — M50.122: Primary | ICD-10-CM

## 2022-05-11 PROCEDURE — 72141 MRI NECK SPINE W/O DYE: CPT

## 2022-05-11 NOTE — DIAGNOSTIC IMAGING REPORT
PROCEDURE: MR imaging cervical spine without contrast.



TECHNIQUE: Multiplanar, multisequence MR imaging of the cervical

spine was performed without contrast.



INDICATION:  Neck and right shoulder pain.



Correlated radiographs 01/03/2020.  No prior cervical MRI.



FINDINGS:  Cervical statures are normal, the alignment is

anatomic and the marrow signal intensity normal.  There are no

Modic endplate changes and the cervical spinal cord has a normal

volume and normal morphology and normal signal intensity.  There

is no paravertebral mass, hemorrhage or fluid collection.



The craniocervical relationship is normal.  The C1-C2, C2-C3 and

C3-C4 as well as C4-C5 levels and discs were normal.  The spinal

canal and neural foramen are widely patent at those levels.



C5-C6: There is disc desiccation and mild disc stature loss. 

Slightly eccentric to the right is broad-based posterior disc

bulge indenting the ventral thecal sac but resulting in only mild

spinal canal stenosis. There is CSF preserved circumscribing the

cord at this level and there was no substantial narrowing of the

neural foramen.



The C6-C7 and C7-T1 levels and discs were normal.  The canal and

foramina widely patent.



IMPRESSION:



Broad-based posterior bulging of disc at C5-C6 without

substantial degrees of stenosis. Normal cord, normal alignment. 

No acute abnormality.



Dictated by: 



  Dictated on workstation # [id number]

## 2022-09-24 ENCOUNTER — HOSPITAL ENCOUNTER (EMERGENCY)
Dept: HOSPITAL 75 - ER | Age: 32
Discharge: HOME | End: 2022-09-24
Payer: MEDICARE

## 2022-09-24 VITALS — WEIGHT: 136.69 LBS | BODY MASS INDEX: 24.22 KG/M2 | HEIGHT: 62.99 IN

## 2022-09-24 VITALS — SYSTOLIC BLOOD PRESSURE: 137 MMHG | DIASTOLIC BLOOD PRESSURE: 85 MMHG

## 2022-09-24 DIAGNOSIS — Z28.310: ICD-10-CM

## 2022-09-24 DIAGNOSIS — K02.9: Primary | ICD-10-CM

## 2022-09-24 PROCEDURE — 99282 EMERGENCY DEPT VISIT SF MDM: CPT

## 2022-09-24 NOTE — ED EENT
History of Present Illness


General


Chief Complaint:  Dental Problems/Pain


Stated Complaint:  DENTAL PAIN


Nursing Triage Note:  


Patient presented to the ER tonight with complaints of dental pain. She advised 


the pain is in her right lower jaw extending into her face and ear


Source:  patient


Exam Limitations:  no limitations





History of Present Illness


Date Seen by Provider:  Sep 24, 2022


Time Seen by Provider:  21:05


Initial Comments


Patient is a 33 yo F who presents to the ED with dental pain that began acutely 

earlier today. Patient states the pain is in her rearmost lower right tooth. She

endorses poor dentition generally. She does not routinely see a dental provider.

Denies any difficulty swallowing, inability to open mouth fully, muffled voice. 

States she has some mild swelling of her right jaw. No foul tasting drainage.


Timing/Duration:  abrupt, this morning


Severity:  moderate


Prearrival Treatment:  over the counter meds


Associated Symptoms:  facial pain/swelling





Allergies and Home Medications


Allergies


Coded Allergies:  


     tramadol (Unverified  Allergy, Mild, RASH, pt has rec Lortab in the past, 

8/10/21)





Patient Home Medication List


Home Medication List Reviewed:  Yes


Docusate Sodium (Colace) 100 Mg Capsule, 100 MG PO BID


   Prescribed by: JULIA CRUZ on 8/10/21 0546


Ibuprofen (Ibuprofen) 800 Mg Tablet, 800 MG PO Q6H PRN for PAIN


   Prescribed by: JULIA CRUZ on 8/10/21 0546


Ketorolac Tromethamine (Ketorolac Tromethamine) 10 Mg Tablet, 10 MG PO Q6H PRN 

for PAIN-MILD (1-4)


   Prescribed by: Clinton Bose on 9/24/22 2113


Multivitamin (Flintstones) 1 Each Tab.chew, 3 EACH PO DAILY, (Reported)


   Entered as Reported by: SHORTY PADRON on 6/17/18 1322


Norgestimate-Ethinyl Estradiol (Sprintec 28 Day Tablet) 1 Each Tablet, 1 EACH PO

DAILY


   Prescribed by: JULIA CRUZ on 8/12/21 0848


Omeprazole (Omeprazole) 20 Mg Tablet.dr, 20 MG PO DAILY, (Reported)


   Entered as Reported by: MARILY NELSON on 7/18/21 0506


Oxycodone HCl/Acetaminophen (Percocet 5-325 mg Tablet) 1 Each Tablet, 1 TAB PO 

Q4H


   Prescribed by: JULIA CRUZ on 8/10/21 0546


Penicillin V Potassium (Penicillin V Potassium) 500 Mg Tablet, 500 MG PO BID


   Prescribed by: Clinton Bose on 9/24/22 2113





Review of Systems


Review of Systems


Constitutional:  no symptoms reported


Eyes:  No Symptoms Reported


Ears:  No Symptoms Reported


Nose:  no symptoms reported


Mouth:  pain, swelling


Throat:  no symptoms reported


Respiratory:  no symptoms reported


Cardiovascular:  no symptoms reported


Gastrointestinal:  no symptoms reported





Past Medical-Social-Family Hx


Immunizations Up To Date


Tetanus Booster (TDap):  Less than 5yrs


PED Vaccines UTD:  Yes





Seasonal Allergies


Seasonal Allergies:  No





Past Medical History


Surgeries:  Yes


Gallbladder


Respiratory:  No


Cardiac:  No


Neurological:  No


Reproductive Disorders:  No


Female Reproductive Disorders:  Denies


Sexually Transmitted Disease:  No


HIV/AIDS:  No


Genitourinary:  Yes


Kidney Infection


Gastrointestinal:  No


Gall Bladder Disease


Musculoskeletal:  No


Fractures


Endocrine:  No


HEENT:  No


Loss of Vision:  Denies


Hearing Impairment:  Denies


Cancer:  No


Psychosocial:  Yes


Anxiety


Integumentary:  No


Blood Disorders:  No


Adverse Reaction/Blood Tranf:  No





Family Medical History





Asthma


  19 FATHER, Onset:Childhood


Hypertension


  Grandparents (Maternal Grandmother)


Thyroid disease


  19 MOTHER, Onset:30's - 40


No Pertinent Family Hx





Physical Exam


Vital Signs





Vital Signs - First Documented








 9/24/22





 21:02


 


Temp 36.6


 


Pulse 86


 


Resp 18


 


B/P (MAP) 137/85 (102)


 


Pulse Ox 96


 


O2 Delivery Room Air








Height, Weight, BMI


Height: 5'3.00"


Weight: 142lbs. 0.0oz. [id number]; 24.00 BMI


Method:Stated


General Appearance:  WD/WN, no apparent distress, mild distress, moderate 

distress, severe distress, cachetic


Nose:  normal inspection


Mouth/Throat:  dental tenderness


Neck:  non-tender, full range of motion, supple, normal inspection


Cardiovascular:  normal peripheral pulses, regular rate, rhythm, no edema, no 

gallop, no JVD, no murmur


Respiratory:  chest non-tender, lungs clear, normal breath sounds, no 

respiratory distress, no accessory muscle use


Gastrointestinal:  normal bowel sounds, non tender, soft, no organomegaly, no 

pulsatile mass, tenderness, spleenomegaly


Neurologic/Psychiatric:  CNs II-XII nml as tested, no motor/sensory deficits, 

alert, normal mood/affect, oriented x 3


Skin:  normal color, warm/dry


poor dentition; broken tooth with significant decay noted in the area of pain; 

minimal right mandibular swelling noted





Progress/Results/Core Measures


Results/Orders


My Orders





Orders - CLINTON BOSE


Ketorolac Injection (Toradol Injection) (9/24/22 21:15)





Vital Signs/I&O











 9/24/22 9/24/22





 21:02 21:20


 


Temp 36.6 


 


Pulse 86 86


 


Resp 18 18


 


B/P (MAP) 137/85 (102) 137/85


 


Pulse Ox 96 96


 


O2 Delivery Room Air Room Air














Blood Pressure Mean:                    102











Progress


Progress Note :  


Progress Note


Patient is nontoxic and well hydrated on exam. Vital signs are reassuring. No 

evidence of deep space infection of the neck on exam. Offered patient IM 

ketorolac and she declined stating she did not want a shot. Will d/c home with 

recs for supportive care and follow-up with PCP for persistent symptoms. Will 

give rx for Pen VK. Follow-up with a dental provider for definitive care. Return

precautions for urgent symptomology discussed. Patient verbalized understanding.





Departure


Impression





   Primary Impression:  


   Dental caries


   Additional Impression:  


   Tooth pain


Disposition:  01 HOME, SELF-CARE


Condition:  Stable





Departure-Patient Inst.


Decision time for Depature:  21:10


Referrals:  


TUAN MCDONALD MD (PCP/Family)


Primary Care Physician


Patient Instructions:  Dental Pain (DC)


Scripts


Penicillin V Potassium (Penicillin V Potassium) 500 Mg Tablet


500 MG PO BID for 7 Days, #14 TAB


   Prov: CLINTON BOSE         9/24/22 


Ketorolac Tromethamine (Ketorolac Tromethamine) 10 Mg Tablet


10 MG PO Q6H PRN for PAIN-MILD (1-4) for 5 Days, #15 TAB


   Prov: CLINTON BOSE         9/24/22











CLINTON BOSE             Sep 24, 2022 21:13

## 2024-02-01 NOTE — DIAGNOSTIC IMAGING REPORT
INDICATION: Left shoulder pain.



TIME OF EXAM: 12:19 p.m.



FINDINGS: Two views of the left shoulder were obtained.

Glenohumeral and acromioclavicular alignment are normal.

Acromiohumeral space is normal. No fracture or dislocation is

seen.



IMPRESSION: No acute bony abnormality is detected.



Dictated by: 



  Dictated on workstation # [id number] Yes
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
## 2025-04-23 NOTE — ED UPPER EXTREMITY
General


Chief Complaint:  Upper Extremity


Stated Complaint:  L SHOULDER PAIN


Nursing Triage Note:  


C/O L shoulder pain radiating to L side of neck. patient reports that the 


shoulder has been hurting for a time but not this severe. denies injury


Nursing Sepsis Screen:  No Definite Risk


Source:  patient


Exam Limitations:  no limitations





History of Present Illness


Date Seen by Provider:  Dec 31, 2019


Time Seen by Provider:  14:47


Initial Comments


To ER with pain at the medial border of the left shoulder blade since last 

night. She's had this intermittently is in in the past, it has gone away on its 

own. Pain is worse with turning her head side-to-side. No fevers chills or inju

ry.


Onset:  yesterday


Severity:  moderate


Pain/Injury Location:  left shoulder


Method of Injury:  unknown


Modifying Factors:  Worse With Movement





Allergies and Home Medications


Allergies


Coded Allergies:  


     tramadol (Unverified  Allergy, Mild, RASH, 12/21/09)





Home Medications


Docusate Sodium 100 Mg Capsule, 100 MG PO BID


   Prescribed by: JULIA CRUZ on 6/18/18 0753


Ibuprofen 800 Mg Tablet, 800 MG PO Q6H


   Prescribed by: JULIA CRUZ on 6/18/18 0753


Multivitamin 1 Each Tab.chew, 3 EACH PO DAILY, (Reported)


Oxycodone HCl/Acetaminophen 1 Each Tablet, 1-2 TAB PO Q4H PRN for PAIN-MODERATE


   Prescribed by: JULIA CRUZ on 6/18/18 0753





Patient Home Medication List


Home Medication List Reviewed:  Yes





Review of Systems


Constitutional:  see HPI


EENTM:  see HPI


Respiratory:  no symptoms reported


Cardiovascular:  no symptoms reported


Genitourinary:  no symptoms reported


Musculoskeletal:  see HPI


Skin:  no symptoms reported


Psychiatric/Neurological:  No Symptoms Reported





Past Medical-Social-Family Hx


Patient Social History


Alcohol Use:  Denies Use


Recreational Drug Use:  No


Smoking Status:  Current Everyday Smoker


Type Used:  Cigarettes


2nd Hand Smoke Exposure:  Yes


Recent Foreign Travel:  No


Contact w/Someone Who Travel:  No


Recent Infectious Disease Expo:  No


Recent Hopitalizations:  No





Immunizations Up To Date


Tetanus Booster (TDap):  Less than 5yrs


PED Vaccines UTD:  Yes





Seasonal Allergies


Seasonal Allergies:  No





Past Medical History


Surgeries:  Yes


Gallbladder


Respiratory:  No


Cardiac:  No


Neurological:  No


Reproductive Disorders:  No


Female Reproductive Disorders:  Denies


Sexually Transmitted Disease:  No


HIV/AIDS:  No


Genitourinary:  Yes


Kidney Infection


Gastrointestinal:  No


Gall Bladder Disease


Musculoskeletal:  No


Fractures


Endocrine:  No


HEENT:  No


Loss of Vision:  Denies


Hearing Impairment:  Denies


Cancer:  No


Psychosocial:  Yes


Anxiety


Integumentary:  No


Blood Disorders:  No


Adverse Reaction/Blood Tranf:  No





Family Medical History





Asthma


  19 FATHER, Onset:Childhood


Hypertension


  Grandparents (Maternal Grandmother)


Thyroid disease


  19 MOTHER, Onset:30's - 40


No Pertinent Family Hx





Physical Exam


Vital Signs





Vital Signs - First Documented








 12/31/19





 14:37


 


Temp 36.5


 


Pulse 100


 


Resp 18


 


B/P (MAP) 114/72 (86)


 


Pulse Ox 100





Capillary Refill : Less Than 3 Seconds


Height, Weight, BMI


Height: 5'3.00"


Weight: 142lbs. 0.0oz. [id number]; 23.00 BMI


Method:Stated


General Appearance:  WD/WN, no apparent distress


HEENT:  PERRL/EOMI, normal ENT inspection


Neck:  non-tender, full range of motion


Respiratory:  no respiratory distress


Gastrointestinal:  normal bowel sounds, non tender


Shoulder:  normal inspection, soft tissue tenderness (tenderness to palpation 

medial border left scapula)


Elbow/Forearm:  normal inspection, non-tender


Wrist:  Yes normal inspection, Yes non-tender


Hand:  normal inspection, non-tender


Neurologic/Psychiatric:  alert, normal mood/affect, oriented x 3


Skin:  normal color, warm/dry





Progress/Results/Core Measures


Results/Orders


My Orders





Orders - ESTEE WEEKS


Ketorolac Injection (Toradol Injection) (12/31/19 15:00)


Orphenadrine Injection (Norflex Injectio (12/31/19 15:00)





Vital Signs/I&O











 12/31/19





 14:37


 


Temp 36.5


 


Pulse 100


 


Resp 18


 


B/P (MAP) 114/72 (86)


 


Pulse Ox 100














Blood Pressure Mean:                    86











Departure


Impression





   Primary Impression:  


   Periscapular pain


Disposition:  01 HOME, SELF-CARE


Condition:  Improved





Departure-Patient Inst.


Decision time for Depature:  14:50


Referrals:  


JULIA MANRIQUE MD (PCP)


Primary Care Physician








TERE FLEMING MD (Family)


Primary Care Physician


Patient Instructions:  Cervical Muscle Strain, Muscle Spasms (DC)





Add. Discharge Instructions:  


1. Warm compresses to the area


2. Anti-inflammatories like ibuprofen in addition to the muscle relaxer I have 

prescribed.





All discharge instructions reviewed with patient and/or family. Voiced 

understanding.


Scripts


Methocarbamol (Robaxin-750) 750 Mg Tablet


750 MG PO Q4H PRN for PAIN-MODERATE (5-7), #20 TAB


   Prov: ESTEE WEEKS         12/31/19


Work/School Note:  Work Release Form   Date Seen in the Emergency Department:  

Dec 31, 2019


   Return to Work:  Jan 2, 2020











ESTEE WEEKS             Dec 31, 2019 14:52 N/A